# Patient Record
Sex: FEMALE | Race: OTHER | NOT HISPANIC OR LATINO | URBAN - METROPOLITAN AREA
[De-identification: names, ages, dates, MRNs, and addresses within clinical notes are randomized per-mention and may not be internally consistent; named-entity substitution may affect disease eponyms.]

---
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
## 2020-10-21 ENCOUNTER — APPOINTMENT (OUTPATIENT)
Dept: PEDIATRIC CARDIOLOGY | Facility: CLINIC | Age: 20
End: 2020-10-21
Payer: COMMERCIAL

## 2020-10-21 VITALS
OXYGEN SATURATION: 99 % | SYSTOLIC BLOOD PRESSURE: 100 MMHG | BODY MASS INDEX: 23.94 KG/M2 | DIASTOLIC BLOOD PRESSURE: 65 MMHG | HEIGHT: 61.81 IN | RESPIRATION RATE: 16 BRPM | HEART RATE: 74 BPM | WEIGHT: 130.07 LBS

## 2020-10-21 PROCEDURE — 93320 DOPPLER ECHO COMPLETE: CPT

## 2020-10-21 PROCEDURE — 93303 ECHO TRANSTHORACIC: CPT

## 2020-10-21 PROCEDURE — 93325 DOPPLER ECHO COLOR FLOW MAPG: CPT

## 2020-10-21 PROCEDURE — 99215 OFFICE O/P EST HI 40 MIN: CPT | Mod: 25

## 2020-10-21 PROCEDURE — 93000 ELECTROCARDIOGRAM COMPLETE: CPT

## 2020-10-21 RX ORDER — HYDROQUINONE 40 MG/G
4 CREAM TOPICAL TWICE DAILY
Qty: 15 | Refills: 1 | Status: DISCONTINUED | COMMUNITY
Start: 2018-07-09 | End: 2020-10-21

## 2020-10-21 RX ORDER — CHLORHEXIDINE GLUCONATE 4 %
325 (65 FE) LIQUID (ML) TOPICAL
Qty: 30 | Refills: 0 | Status: DISCONTINUED | COMMUNITY
Start: 2018-03-28 | End: 2020-10-21

## 2020-10-21 RX ORDER — MOMETASONE FUROATE 1 MG/G
0.1 CREAM TOPICAL DAILY
Qty: 30 | Refills: 0 | Status: DISCONTINUED | COMMUNITY
Start: 2018-04-26 | End: 2020-10-21

## 2020-10-21 NOTE — DISCUSSION/SUMMARY
[FreeTextEntry1] :  In summary, Stephanie is 20 years old status post Normantelljacinto in 2007 where a 18 mm Contegra RV to PA conduit was placed.  She has evidence of mild stenosis and moderate pulmonary insufficiency with an acceptable right ventricular pressure and volumes by MRI in 2019.  We would repeat MRI next year.  I have also recommended a Holter monitor today to detect the palpitation episodes and will consider an event monitor if Holter fails to  her episode.  She will also have a surveillance exercise test schedule the next few months.  We discussed the general criteria for pulmonary valve placement and the importance of surveillance by modalities as described above.  We once again stressed the importance of good dental hygiene and regular dental visits. SBE prophylaxis is indicated prior to dental or surgical procedures.  We will see her for follow up in 1 year.  [Needs SBE Prophylaxis] : [unfilled]  needs bacterial endocarditis prophylaxis. SBE prophylaxis is indicated for dental and invasive ENT procedures. (Circulation. 2007; 116: 8224-9564) [PE + No Restrictions] : [unfilled] may participate in the entire physical education program without restriction, including all varsity competitive sports. [Influenza vaccine is recommended] : Influenza vaccine is recommended

## 2020-10-21 NOTE — CARDIOLOGY SUMMARY
[Today's Date] : [unfilled] [FreeTextEntry1] : Normal sinus rhythm and evidence of right bundle branch block. [FreeTextEntry2] : There is evidence of mild conduit obstruction with a peak gradient in the 30s with moderate pulmonary insufficiency.  There is moderate tricuspid regurgitation estimating the right ventricular pressure in the high 40s to low 50s approximately half systemic.  This finding has minimally changed to the upside.

## 2020-10-21 NOTE — HISTORY OF PRESENT ILLNESS
[FreeTextEntry1] : We had the pleasure of evaluating Stephanie in our pediatric cardiology clinic at [facility] on october 21st,2020. 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 She denies any symptoms except lately she has woken up few times feeling her heart racing which she thinks is from anxiety.  The palpitation is not too fast to count and slows down when she relaxes and drinks some water.  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  Last surveillance MRI in july 2019 was reassuring and unchanged.  She also had plastic surgery for keloid last year.\par \par Her cardiac history can be summarized as follows:\par \par 2000 (OR, Dr. Miner): Rastelli operation with 11 mm aortic homograft.\par \par 2002 (Cath): Placement of Corinthian mounted stent 8 mm X 13 mm in her LPA which was subsequently, dilated up to 12 mm in April 2007. \par \par 02/13/2008 (OR, Dr. Miner): Placement of an 18 mm Contegra conduit from her right ventricle to her pulmonary artery and left pulmonary surgical angioplasty. \par \par 2/19/13 lung perfusion scan: right lung 60%, left lung 40%. \par \par 10/9/14 CMR:  right ventricular end diastolic volume indexed for BSA of 110ml/m2, a right ventricular ejection fraction of 58%, and pulmonary regurgitant flow of 13%. Her right lung flow was 58.5% and left lung flow was 41.5%.\par \par 7/27/17 CMR:  right ventricular end diastolic volume indexed for BSA of 119ml/m2, a right ventricular ejection fraction of 50%, and pulmonary regurgitant flow of 19%. Her right lung flow was 57% and left heart flow 43%.\par \par 2019 CMR: LVEDV (ml)/ (Indexed to BSA): 107.2/65.8 (z: -1.10; *Mean+/-SD: 77.7+/- 10.8) \par LVESV (ml)/ (Indexed to BSA): 49.7/ 30.5 \par LVSV (ml)/ (Indexed to BSA): 57.5/ 35.3 \par LV EF (%): 53.7 (z: -2.10; *Mean+/-SD: 64+/-4.9) \par LV CO (l/min): 4.04 \par LV CI (l/min/m2): 2.48 \par LV mass/ (Indexed to BSA) (gm): 61.7/39.3 (z: -1.72; *Mean+/-SD: 52+/-7.4) \par RVEDV (ml)/ (Indexed to BSA): 154.7/ 95 (z: 1.43; *Mean+/-SD: 75.2+/-13.8) \par RVESV (ml)/ (Indexed to BSA): 69/ 42.4 \par RVSV (ml)/ (Indexed to BSA): 85.8/52.7 \par RVEF (%): 55.4 (z: -0.88; 59.8+/- 5) \par RV CO (l/min): 6.02 \par RV CI (l/min/m2): 3.70\par \par \par

## 2020-10-21 NOTE — REASON FOR VISIT
[Follow-Up] : a follow-up visit for [Tricuspid Atresia] : tricuspid atresia [Patient] : patient [Mother] : mother [FreeTextEntry3] : S/P Jie

## 2020-10-21 NOTE — PHYSICAL EXAM
[Apical Impulse] : quiet precordium with normal apical impulse [Heart Rate And Rhythm] : normal heart rate and rhythm [Heart Sounds] : normal S1 and S2 [Heart Sounds Gallop] : no gallops [Heart Sounds Pericardial Friction Rub] : no pericardial rub [Heart Sounds Click] : no clicks [Arterial Pulses] : normal upper and lower extremity pulses with no pulse delay [Edema] : no edema [Capillary Refill Test] : normal capillary refill [Systolic] : systolic [III] : a grade 3/6   [Diastolic] : diastolic [II] : a grade 2/6 [LUSB] : LUSB [Cervical Lymph Nodes Enlarged Anterior] : The anterior cervical nodes were normal [Cervical Lymph Nodes Enlarged Posterior] : The posterior cervical nodes were normal [Skin Lesions] : no lesions [Skin Turgor] : normal turgor [Nail Clubbing] : no clubbing  or cyanosis of the fingers [General Appearance - Alert] : alert [General Appearance - In No Acute Distress] : in no acute distress [General Appearance - Well Nourished] : well nourished [General Appearance - Well Developed] : well developed [General Appearance - Well-Appearing] : well appearing [Appearance Of Head] : the head was normocephalic [Facies] : there were no dysmorphic facial features [Auscultation Breath Sounds / Voice Sounds] : breath sounds clear to auscultation bilaterally [Sternotomy] : sternotomy [Hypertrophic] : hypertrophic [Bowel Sounds] : normal bowel sounds [Abdomen Soft] : soft [Nondistended] : nondistended [Abdomen Tenderness] : non-tender [] : no hepato-splenomegaly [Motor Tone] : muscle strength and tone were normal [Demonstrated Behavior - Infant Nonreactive To Parents] : interactive [Mood] : mood and affect were appropriate for age [Demonstrated Behavior] : normal behavior [Sclera] : the conjunctiva were normal

## 2020-12-10 NOTE — ASU PREOP CHECKLIST, PEDIATRIC - PATIENT'S PERSONAL PROPERTY GIVEN TO
Patient participated in identification of needs/problems/goals for treatment/Patient participated in defining interventions/Patient participated in development of after care plan
family member

## 2020-12-30 NOTE — CONSULT LETTER
[Today's Date] : [unfilled] [Name] : Name: [unfilled] [] : : ~~ [Today's Date:] : [unfilled] [Dear  ___:] : Dear Dr. [unfilled]: [Consult] : I had the pleasure of evaluating your patient, [unfilled]. My full evaluation follows. [Consult - Multiple Provider] : Thank you very much for allowing us to participate in the care of this patient. If you have any questions, please do not hesitate to contact us. [Sincerely,] : Sincerely, [FreeTextEntry4] : Dr. Elvin Rahman [FreeTextEntry5] : [address] [FreeTextEntry6] : [place] [address] [de-identified] : Andrea Jackson MD\par Director, Pediatric catheterization Lab\par [facility]\par , [facility]\par Telephone: [phone]\par [phone]\par \par  Discharged

## 2021-06-10 ENCOUNTER — RESULT REVIEW (OUTPATIENT)
Age: 21
End: 2021-06-10

## 2021-09-08 ENCOUNTER — APPOINTMENT (OUTPATIENT)
Dept: MRI IMAGING | Facility: HOSPITAL | Age: 21
End: 2021-09-08

## 2021-09-08 ENCOUNTER — OUTPATIENT (OUTPATIENT)
Dept: OUTPATIENT SERVICES | Age: 21
LOS: 1 days | End: 2021-09-08

## 2021-09-08 DIAGNOSIS — Z98.890 OTHER SPECIFIED POSTPROCEDURAL STATES: ICD-10-CM

## 2021-09-08 DIAGNOSIS — Z98.89 OTHER SPECIFIED POSTPROCEDURAL STATES: Chronic | ICD-10-CM

## 2021-09-08 DIAGNOSIS — Z98.890 OTHER SPECIFIED POSTPROCEDURAL STATES: Chronic | ICD-10-CM

## 2022-01-09 ENCOUNTER — EMERGENCY (EMERGENCY)
Facility: HOSPITAL | Age: 22
LOS: 1 days | Discharge: LEFT BEFORE DISCHARGE | End: 2022-01-09
Payer: SELF-PAY

## 2022-01-09 VITALS
WEIGHT: 133.82 LBS | DIASTOLIC BLOOD PRESSURE: 81 MMHG | TEMPERATURE: 98 F | HEART RATE: 83 BPM | RESPIRATION RATE: 18 BRPM | OXYGEN SATURATION: 98 % | SYSTOLIC BLOOD PRESSURE: 117 MMHG

## 2022-01-09 DIAGNOSIS — Z98.890 OTHER SPECIFIED POSTPROCEDURAL STATES: Chronic | ICD-10-CM

## 2022-01-09 DIAGNOSIS — Z98.89 OTHER SPECIFIED POSTPROCEDURAL STATES: Chronic | ICD-10-CM

## 2022-01-09 PROCEDURE — L9991: CPT

## 2022-12-14 ENCOUNTER — APPOINTMENT (OUTPATIENT)
Dept: PEDIATRIC CARDIOLOGY | Facility: CLINIC | Age: 22
End: 2022-12-14

## 2022-12-14 VITALS
DIASTOLIC BLOOD PRESSURE: 74 MMHG | SYSTOLIC BLOOD PRESSURE: 110 MMHG | OXYGEN SATURATION: 98 % | BODY MASS INDEX: 27.67 KG/M2 | HEIGHT: 61.81 IN | WEIGHT: 150.36 LBS | HEART RATE: 82 BPM | RESPIRATION RATE: 18 BRPM

## 2022-12-14 DIAGNOSIS — Z87.74 PERSONAL HISTORY OF (CORRECTED) CONGENITAL MALFORMATIONS OF HEART AND CIRCULATORY SYSTEM: ICD-10-CM

## 2022-12-14 DIAGNOSIS — Q22.0 PULMONARY VALVE ATRESIA: ICD-10-CM

## 2022-12-14 DIAGNOSIS — Q21.0 VENTRICULAR SEPTAL DEFECT: ICD-10-CM

## 2022-12-14 DIAGNOSIS — Z98.890 OTHER SPECIFIED POSTPROCEDURAL STATES: ICD-10-CM

## 2022-12-14 DIAGNOSIS — R00.2 PALPITATIONS: ICD-10-CM

## 2022-12-14 PROCEDURE — 99215 OFFICE O/P EST HI 40 MIN: CPT | Mod: GC,25

## 2022-12-14 PROCEDURE — 93303 ECHO TRANSTHORACIC: CPT

## 2022-12-14 PROCEDURE — 93000 ELECTROCARDIOGRAM COMPLETE: CPT | Mod: GC

## 2022-12-14 PROCEDURE — 93320 DOPPLER ECHO COMPLETE: CPT

## 2022-12-14 PROCEDURE — 93325 DOPPLER ECHO COLOR FLOW MAPG: CPT

## 2022-12-22 NOTE — CONSULT LETTER
[Today's Date] : [unfilled] [Name] : Name: [unfilled] [] : : ~~ [Today's Date:] : [unfilled] [Dear  ___:] : Dear Dr. [unfilled]: [Consult] : I had the pleasure of evaluating your patient, [unfilled]. My full evaluation follows. [Consult - Multiple Provider] : Thank you very much for allowing us to participate in the care of this patient. If you have any questions, please do not hesitate to contact us. [Sincerely,] : Sincerely, [FreeTextEntry4] : Dr. Elvin Rahman [FreeTextEntry5] : [address] [FreeTextEntry6] : [place] [address] [de-identified] : Dr Frank, A\par fellow in training\par \par Andrea Jackson MD\par Director, Pediatric catheterization Lab\par [facility]\par , [facility]\par Telephone: [phone]\par [phone]\par \par

## 2022-12-22 NOTE — CARDIOLOGY SUMMARY
[Today's Date] : [unfilled] [FreeTextEntry1] : Normal sinus rhythm and evidence of right bundle branch block. [FreeTextEntry2] : There is evidence of mild conduit obstruction with a peak gradient in the 30s with moderate pulmonary insufficiency.  There is moderate tricuspid regurgitation estimating the right ventricular pressure in the high 40s to low 50s approximately half systemic.  This finding is unchanged.

## 2022-12-22 NOTE — DISCUSSION/SUMMARY
[Needs SBE Prophylaxis] : [unfilled]  needs bacterial endocarditis prophylaxis. SBE prophylaxis is indicated for dental and invasive ENT procedures. (Circulation. 2007; 116: 1586-0639) [PE + No Restrictions] : [unfilled] may participate in the entire physical education program without restriction, including all varsity competitive sports. [Influenza vaccine is recommended] : Influenza vaccine is recommended [FreeTextEntry1] :  In summary, Stephanie is 22 years old status post Rastelli in 2007 where a 18 mm Contegra RV to PA conduit was placed.  She has evidence of mild stenosis and moderate pulmonary insufficiency with an acceptable right ventricular pressure and volumes by MRI in 2019.  We initially planned to repeat the MRI but Stephanie was unable to tolerate the procedure without sedation. We will plan for cardiac CT instead. I have also recommended a Holter monitor today to detect the palpitation episodes and will consider an event monitor if Holter fails to  her episode.  She will also have a surveillance exercise test schedule the next month.  We discussed the general criteria for pulmonary valve placement and the importance of surveillance by modalities as described above.  We once again stressed the importance of good dental hygiene and regular dental visits. SBE prophylaxis is indicated prior to dental or surgical procedures.  We will see her for follow up in 1 year.

## 2022-12-22 NOTE — REASON FOR VISIT
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

## 2022-12-22 NOTE — HISTORY OF PRESENT ILLNESS
[FreeTextEntry1] : We had the pleasure of evaluating Stephanie in our pediatric cardiology clinic at [facility] on December 14th, 2022. 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 She denies any symptoms except for feeling palpitations 4-5 beats every few days. This occurs when laying down at night and never during exercise or with lightheadedness/syncope. The palpitations do not seem fast and come and go in a few seconds. 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 Last surveillance MRI in july 2019 was reassuring and unchanged. She was planned for MRI after our last visit but felt claustrophobic and was unable to complete it. She also had plastic surgery for keloid in 2019.\par \par Her cardiac history can be summarized as follows:\par \par 2000 (OR, Dr. Miner): Rastelli operation with 11 mm aortic homograft.\par \par 2002 (Cath): Placement of Corinthian mounted stent 8 mm X 13 mm in her LPA which was subsequently, dilated up to 12 mm in April 2007. \par \par 02/13/2008 (OR, Dr. Miner): Placement of an 18 mm Contegra conduit from her right ventricle to her pulmonary artery and left pulmonary surgical angioplasty. \par \par 2/19/13 lung perfusion scan: right lung 60%, left lung 40%. \par \par 10/9/14 CMR:  right ventricular end diastolic volume indexed for BSA of 110ml/m2, a right ventricular ejection fraction of 58%, and pulmonary regurgitant flow of 13%. Her right lung flow was 58.5% and left lung flow was 41.5%.\par \par 7/27/17 CMR:  right ventricular end diastolic volume indexed for BSA of 119ml/m2, a right ventricular ejection fraction of 50%, and pulmonary regurgitant flow of 19%. Her right lung flow was 57% and left heart flow 43%.\par \par 2019 CMR: LVEDV (ml)/ (Indexed to BSA): 107.2/65.8 (z: -1.10; *Mean+/-SD: 77.7+/- 10.8) \par LVESV (ml)/ (Indexed to BSA): 49.7/ 30.5 \par LVSV (ml)/ (Indexed to BSA): 57.5/ 35.3 \par LV EF (%): 53.7 (z: -2.10; *Mean+/-SD: 64+/-4.9) \par LV CO (l/min): 4.04 \par LV CI (l/min/m2): 2.48 \par LV mass/ (Indexed to BSA) (gm): 61.7/39.3 (z: -1.72; *Mean+/-SD: 52+/-7.4) \par RVEDV (ml)/ (Indexed to BSA): 154.7/ 95 (z: 1.43; *Mean+/-SD: 75.2+/-13.8) \par RVESV (ml)/ (Indexed to BSA): 69/ 42.4 \par RVSV (ml)/ (Indexed to BSA): 85.8/52.7 \par RVEF (%): 55.4 (z: -0.88; 59.8+/- 5) \par RV CO (l/min): 6.02 \par RV CI (l/min/m2): 3.70\par \par \par

## 2023-01-09 ENCOUNTER — APPOINTMENT (OUTPATIENT)
Dept: PEDIATRIC CARDIOLOGY | Facility: CLINIC | Age: 23
End: 2023-01-09
Payer: COMMERCIAL

## 2023-01-09 PROCEDURE — 93015 CV STRESS TEST SUPVJ I&R: CPT

## 2023-01-09 PROCEDURE — 94010 BREATHING CAPACITY TEST: CPT

## 2023-01-09 PROCEDURE — 94681 O2 UPTK CO2 OUTP % O2 XTRC: CPT

## 2023-02-15 ENCOUNTER — OUTPATIENT (OUTPATIENT)
Dept: OUTPATIENT SERVICES | Age: 23
LOS: 1 days | End: 2023-02-15

## 2023-02-15 ENCOUNTER — APPOINTMENT (OUTPATIENT)
Dept: CT IMAGING | Facility: HOSPITAL | Age: 23
End: 2023-02-15

## 2023-02-15 DIAGNOSIS — Q21.0 VENTRICULAR SEPTAL DEFECT: ICD-10-CM

## 2023-02-15 DIAGNOSIS — Z98.89 OTHER SPECIFIED POSTPROCEDURAL STATES: Chronic | ICD-10-CM

## 2023-02-15 DIAGNOSIS — Z98.890 OTHER SPECIFIED POSTPROCEDURAL STATES: Chronic | ICD-10-CM

## 2023-02-15 DIAGNOSIS — Q22.0 PULMONARY VALVE ATRESIA: ICD-10-CM

## 2023-02-15 LAB — HCG UR QL: NEGATIVE — SIGNIFICANT CHANGE UP

## 2023-07-03 NOTE — REASON FOR VISIT
Vaccine status unknown [Follow-Up] : a follow-up visit for [Tricuspid Atresia] : tricuspid atresia [Patient] : patient [Mother] : mother [FreeTextEntry3] : S/P Jie

## 2023-10-05 NOTE — PRE-ANESTHESIA EVALUATION ADULT - HEIGHT IN INCHES
What Is The Reason For Today's Visit?: Full Body Skin Examination What Is The Reason For Today's Visit? (Being Monitored For X): concerning skin lesions on an annual basis Additional History: No concerns.\\n 2

## 2023-12-29 NOTE — ASU DISCHARGE PLAN (ADULT/PEDIATRIC). - NS AS DC DISCHARGE ACCOMPANY
Occupational Therapy  3EF    Patient not seen in therapy.     Unavailable due to medical tests/procedures.      Re-attempt plan: per established plan of care    Patient off floor at dialysis.                               Therapy procedure time and total treatment time can be found documented on the Time Entry flowsheet   Parents

## 2024-06-18 NOTE — ED ADULT TRIAGE NOTE - WEIGHT IN LBS
133.8 Detail Level: Detailed Show Aperture Variable?: Yes Consent: The patient's consent was obtained including but not limited to risks of crusting, scabbing, blistering, scarring, darker or lighter pigmentary change, recurrence, incomplete removal and infection. Post-Care Instructions: I reviewed with the patient in detail post-care instructions. Patient is to wear sunprotection, and avoid picking at any of the treated lesions. Pt may apply Vaseline to crusted or scabbing areas. Duration Of Freeze Thaw-Cycle (Seconds): 0 Render Post-Care Instructions In Note?: no

## 2025-02-28 NOTE — ASU PATIENT PROFILE, ADULT - PMH
Patient Specific Counseling (Will Not Stick From Patient To Patient): Recommended washing with BPO and switching pillow cases 2-3x per week. \\nPt given office phone number to call if he notices another cyst starting for PO abx.
Detail Level: Simple
Hypertrophic scar  midsternal  Pulmonary atresia    VSD (ventricular septal defect)